# Patient Record
Sex: FEMALE | Race: WHITE
[De-identification: names, ages, dates, MRNs, and addresses within clinical notes are randomized per-mention and may not be internally consistent; named-entity substitution may affect disease eponyms.]

---

## 2018-01-23 ENCOUNTER — HOSPITAL ENCOUNTER (INPATIENT)
Dept: HOSPITAL 14 - H.ER | Age: 23
LOS: 2 days | Discharge: HOME | DRG: 134 | End: 2018-01-25
Attending: INTERNAL MEDICINE | Admitting: INTERNAL MEDICINE
Payer: COMMERCIAL

## 2018-01-23 DIAGNOSIS — G43.909: ICD-10-CM

## 2018-01-23 DIAGNOSIS — J36: Primary | ICD-10-CM

## 2018-01-23 DIAGNOSIS — F32.9: ICD-10-CM

## 2018-01-23 DIAGNOSIS — J45.909: ICD-10-CM

## 2018-01-23 LAB
ALBUMIN SERPL-MCNC: 4.6 G/DL (ref 3.5–5)
ALBUMIN/GLOB SERPL: 1.2 {RATIO} (ref 1–2.1)
ALT SERPL-CCNC: 24 U/L (ref 9–52)
AST SERPL-CCNC: 28 U/L (ref 14–36)
BASOPHILS # BLD AUTO: 0 K/UL (ref 0–0.2)
BASOPHILS NFR BLD: 0.1 % (ref 0–2)
BASOPHILS NFR BLD: 1 % (ref 0–2)
BUN SERPL-MCNC: 11 MG/DL (ref 7–17)
CALCIUM SERPL-MCNC: 9.2 MG/DL (ref 8.4–10.2)
EOSINOPHIL # BLD AUTO: 0 K/UL (ref 0–0.7)
EOSINOPHIL NFR BLD: 0.1 % (ref 0–4)
ERYTHROCYTE [DISTWIDTH] IN BLOOD BY AUTOMATED COUNT: 12.9 % (ref 11.5–14.5)
GFR NON-AFRICAN AMERICAN: > 60
HGB BLD-MCNC: 12.9 G/DL (ref 12–16)
LYMPHOCYTE: 7 % (ref 20–50)
LYMPHOCYTES # BLD AUTO: 0.7 K/UL (ref 1–4.3)
LYMPHOCYTES NFR BLD AUTO: 5.2 % (ref 20–40)
MCH RBC QN AUTO: 29.2 PG (ref 27–31)
MCHC RBC AUTO-ENTMCNC: 32.5 G/DL (ref 33–37)
MCV RBC AUTO: 89.9 FL (ref 81–99)
MONOCYTE: 2 % (ref 0–10)
MONOCYTES # BLD: 0.4 K/UL (ref 0–0.8)
MONOCYTES NFR BLD: 2.8 % (ref 0–10)
NEUTROPHILS # BLD: 11.7 K/UL (ref 1.8–7)
NEUTROPHILS NFR BLD AUTO: 88 % (ref 42–75)
NEUTROPHILS NFR BLD AUTO: 91.8 % (ref 50–75)
NEUTS BAND NFR BLD: 2 % (ref 0–2)
NRBC BLD AUTO-RTO: 0 % (ref 0–0)
PLATELET # BLD EST: NORMAL 10*3/UL
PLATELET # BLD: 331 K/UL (ref 130–400)
PMV BLD AUTO: 6.9 FL (ref 7.2–11.7)
RBC # BLD AUTO: 4.4 MIL/UL (ref 3.8–5.2)
TOTAL CELLS COUNTED BLD: 100
WBC # BLD AUTO: 12.8 K/UL (ref 4.8–10.8)

## 2018-01-23 NOTE — ED PDOC
HPI: CCC, URI, Sore Throat


Time Seen by Provider: 01/23/18 16:22


Chief Complaint (Nursing): ENT Problem


History Per: Patient


Onset/Duration Of Symptoms: Days (6)


Current Symptoms Are (Timing): Still Present


Location Of Pain: Throat


Associated Symptoms: Fever, Sore Throat


Severity: Moderate


Pain Scale Rating Of: 3


Additional Complaint(s): 





Sore throat x 1 week. tx'ed with Augmentin 875 BID x 6 days but has gotten 

worse. Difficult swallowing but denies SOB. Able to swallow saliva.





Past Medical History


Vital Signs: 


 Last Vital Signs











Temp  97.7 F   01/25/18 08:34


 


Pulse  68   01/25/18 08:34


 


Resp  20   01/25/18 08:34


 


BP  94/61 L  01/25/18 08:34


 


Pulse Ox  97   01/25/18 08:34














- Medical History


PMH: No Chronic Diseases





- Family History


Family History: States: Unknown Family Hx





- Home Medications


Home Medications: 


 Ambulatory Orders











 Medication  Instructions  Recorded


 


Sertraline [Zoloft] 100 mg PO DAILY 01/23/18


 


predniSONE [predniSONE Tab] 5 mg PO DAILY 01/23/18














- Allergies


Allergies/Adverse Reactions: 


 Allergies











Allergy/AdvReac Type Severity Reaction Status Date / Time


 


No Known Allergies Allergy   Verified 01/23/18 16:12














Review of Systems


Constitutional: Positive for: Fever


ENT: Positive for: Throat Pain, Throat Swelling


Respiratory: Negative for: Cough


Gastrointestinal: Negative for: Nausea, Vomiting





Physical Exam





- Physical Exam


Appears: Positive for: Non-toxic, No Acute Distress


Skin: Positive for: Normal Color, Warm, DRY


ENT: Positive for: Tonsillar Exudate, Tonsillar Swelling, Other (Tonsils 

touching unable to see uvula.)


Neck: Positive for: Normal


Cardiovascular/Chest: Positive for: Regular Rate, Rhythm


Respiratory: Positive for: Normal Breath Sounds


Extremity: Positive for: Normal ROM


Neurologic/Psych: Positive for: Alert, Oriented





- Laboratory Results


Result Diagrams: 


 01/25/18 06:05





 01/25/18 06:05





- ECG


O2 Sat by Pulse Oximetry: 97





Disposition





- Clinical Impression


Clinical Impression: 


 Tonsillitis, Peritonsillar abscess








- Patient ED Disposition


Is Patient to be Admitted: Transfer of Care





- Disposition


Disposition: Transfer of Care


Disposition Time: 17:00


Condition: FAIR


Patient Signed Over To: Salomón Berman III


Handoff Comments: Pending CT Neck, Labs, reeval

## 2018-01-23 NOTE — ED PDOC
- Laboratory Results


Result Diagrams: 


 01/23/18 17:04





 01/23/18 17:04





- ECG


O2 Sat by Pulse Oximetry: 97 (RA)


Pulse Ox Interpretation: Normal





Medical Decision Making


Medical Decision Making: 


Time: 19:00 


Transfer of care endorsed to me pending CT neck and reevaluation 





Time: 19:14 


CT Neck soft tissue 


FINDINGS:


Nasopharynx: Unremarkable.


Oropharynx: There is tonsillar enlargement bilaterally with enhancement and 

striations, consistent


with pharyngitis. There is a 1.6 x 0.4 cm low-attenuation noted on the right 

which could reflect an


early abscess, (image 30).


Larynx: Unremarkable. Normal epiglottis.


Trachea: Unremarkable.


Retropharyngeal space: Unremarkable.


Submandibular/parotid glands: Unremarkable. Glands are normal in size.


Thyroid: Unremarkable. No enlarged or calcified nodules.


Bones/joints: No acute fracture.


Soft tissues: See above.


Vasculature: No acute findings.


Lymph nodes: There are enlarged lymph nodes identified in both sides of the 

neck which are


presumably related to inflammatory causes although nonspecific.


Lung apices: Unremarkable as visualized.


IMPRESSION:


There is tonsillar enlargement bilaterally with enhancement and striations, 

consistent with


pharyngitis. There is a 1.6 x 0.4 cm low-attenuation noted on the right which 

could reflect an early


abscess.





Reports revealed the possibility of an early abscess. Labs were reviewed and 

are normal. She was given a dose of Zosyn. 





Time: 20:25


--Consulted with Dr. Behin 


--agreed with IV abx and decision to admit





Time: 20:27


--Consulted with Dr. Castillo


--Will admit patient to Dr. Castillo who is covering for Dr. Voss.








--------------------------------------------------------------------------------

-----------------





Scribe Attestation: 


Documented by Kat Solares, acting as a scribe for Shawn Blandon MD 





Provider Scribe Attestation:


All medical record entries made by the Scribe were at my direction and 

personally dictated by me. I have reviewed the chart and agree that the record 

accurately reflects my personal performance of the history, physical exam, 

medical decision making, and the department course for this patient. I have 

also personally directed, reviewed, and agree with the discharge instructions 

and disposition





Disposition


Discussed With Dr.: Farhat Castillo


Counseled Patient/Family Regarding: Studies Performed, Diagnosis





- Clinical Impression


Clinical Impression: 


 Tonsillitis, Peritonsillar abscess








- POA


Present On Arrival: None





- Disposition


Disposition: Hospitalized as Observation Patient


Disposition Time: 20:27


Condition: FAIR


Patient Signed Over To: Farhat Castillo

## 2018-01-24 VITALS — RESPIRATION RATE: 20 BRPM

## 2018-01-24 LAB
ALBUMIN SERPL-MCNC: 4.5 G/DL (ref 3.5–5)
ALBUMIN/GLOB SERPL: 1.2 {RATIO} (ref 1–2.1)
ALT SERPL-CCNC: 17 U/L (ref 9–52)
APTT BLD: 29.4 SECONDS (ref 25.6–37.1)
AST SERPL-CCNC: 21 U/L (ref 14–36)
BUN SERPL-MCNC: 9 MG/DL (ref 7–17)
CALCIUM SERPL-MCNC: 9.5 MG/DL (ref 8.4–10.2)
ERYTHROCYTE [DISTWIDTH] IN BLOOD BY AUTOMATED COUNT: 12.9 % (ref 11.5–14.5)
GFR NON-AFRICAN AMERICAN: > 60
HGB BLD-MCNC: 13.1 G/DL (ref 12–16)
INR PPP: 1.2 (ref 0.9–1.2)
MCH RBC QN AUTO: 29.6 PG (ref 27–31)
MCHC RBC AUTO-ENTMCNC: 32.9 G/DL (ref 33–37)
MCV RBC AUTO: 89.8 FL (ref 81–99)
PLATELET # BLD: 370 K/UL (ref 130–400)
PROTHROMBIN TIME: 13.5 SECONDS (ref 9.8–13.1)
RBC # BLD AUTO: 4.44 MIL/UL (ref 3.8–5.2)
T3: 0.88 NMOL/L (ref 1.49–2.6)
T4 SERPL-MCNC: 8.96 UG/DL (ref 5.5–11)
WBC # BLD AUTO: 8.5 K/UL (ref 4.8–10.8)

## 2018-01-24 PROCEDURE — 0C9P0ZZ DRAINAGE OF TONSILS, OPEN APPROACH: ICD-10-PCS

## 2018-01-24 RX ADMIN — CLINDAMYCIN PHOSPHATE SCH MLS/HR: 150 INJECTION, SOLUTION INTRAMUSCULAR; INTRAVENOUS at 14:02

## 2018-01-24 RX ADMIN — CLINDAMYCIN PHOSPHATE SCH: 150 INJECTION, SOLUTION INTRAMUSCULAR; INTRAVENOUS at 16:22

## 2018-01-24 RX ADMIN — CLINDAMYCIN PHOSPHATE SCH MLS/HR: 150 INJECTION, SOLUTION INTRAMUSCULAR; INTRAVENOUS at 21:47

## 2018-01-24 RX ADMIN — DEXTROSE AND SODIUM CHLORIDE SCH MLS/HR: 5; 450 INJECTION, SOLUTION INTRAVENOUS at 00:18

## 2018-01-24 RX ADMIN — DEXTROSE AND SODIUM CHLORIDE SCH: 5; 450 INJECTION, SOLUTION INTRAVENOUS at 12:04

## 2018-01-24 RX ADMIN — DEXTROSE AND SODIUM CHLORIDE SCH MLS/HR: 5; 450 INJECTION, SOLUTION INTRAVENOUS at 14:05

## 2018-01-24 RX ADMIN — DEXAMETHASONE SODIUM PHOSPHATE SCH MG: 4 INJECTION, SOLUTION INTRAMUSCULAR; INTRAVENOUS at 18:09

## 2018-01-24 RX ADMIN — DEXAMETHASONE SODIUM PHOSPHATE SCH MG: 4 INJECTION, SOLUTION INTRAMUSCULAR; INTRAVENOUS at 12:05

## 2018-01-24 NOTE — CT
PROCEDURE:  CT NECK WITH  CONTRAST



HISTORY:

r/o peritonsilar abscess



COMPARISON:

None



TECHNIQUE:

CT of the neck with intravenous contrast. Coronal and sagittal 

reformats generated.



Intravenous contrast dose: 80 cc Visipaque 300



Radiation dose:  mGy-cm



This CT exam was performed using one or more of the following dose 

reduction techniques: Automated exposure control, adjustment of the 

mA and/or kV according to patient size, and/or use of iterative 

reconstruction technique.



FINDINGS:



NASOPHARYNX:

Unremarkable.



SUPRAHYOID NECK:

There is bilateral enlargement of the tonsils with the right 

tonsillar superficial 10 x 8 mm hypodensity perceived. No definitive 

peripheral rim of intense enhancement disc suggested. - technical FX 

versus very early abscess formation here is a consideration. (Axis 

series 2, image 29 and 30)



INFRAHYOID NECK:

Unremarkable larynx (there is asymmetry of the aeration of the 

vallecular line-only air/gas is seen the right vallecula -none is 

seen in the left vallecula -this can be due to mucous and apposition),

 hypopharynx, and supraglottic space. Vocal cords intact.



MASS:

None.



GLANDS:

Parotid and submandibular glands unremarkable. Normal size thyroid 

gland, without nodule.



LYMPH NODES:

Bilateral nonspecific cervical lymph nodes present likely 

hyperplastic changes relating to patient's inferred tonsillitis 



CERVICAL SPINE:

No fracture or focal lesion. 



VASCULAR STRUCTURES:

Unremarkable.



OTHER FINDINGS:

None.



IMPRESSION:

Bilateral tonsillar enlargement with right superficial possible early 

abscess. The tonsil enlargement results in airway encroachment. The 

cervical lymph nodes present likely hyperplastic changes relating to 

patient's inferred tonsillitis 



Comments: This report is concordant with the preliminary V rad report 



Sign

## 2018-01-24 NOTE — CP.PCM.HP
History of Present Illness





- History of Present Illness


History of Present Illness: 


CC: Throat pain.





23 y/o F, with PMHx. Asthma, Migraine, Depression, came to ED [facility] to be 

evaluated for Sore throat that began a week PTA, Pt was taking Augmentin  875 

bid x 6 days with no relief of pain.





Pt c/o of increased throat discomfort and pain associated to glandular swelling 

b/l, pain was dull, sharp, continue, moderate intensity 6:10.





Worsening symptoms: Difficulty swallowing, low grade fever (tactile while at 

home), headache, ears ache.





Aggravated factor:  Unable to eat.





Pt denied:  SOB, cough, CP, palpitations, abdominal pain, n/v/d, dizziness, 

weakness, sick contact, recent travel outside [place].





Soft Tissue Neck CT shows:  Tonsillar enlargement with R superficial possible 

early abscess.























Present on Admission





- Present on Admission


Any Indicators Present on Admission: No





Review of Systems





- Constitutional


Constitutional: Fever, Headache





- EENT


Eyes: Other (negative)


Ears: Ear Pain


Nose/Mouth/Throat: Sore Throat, Throat Swelling, Neck Pain





- Cardiovascular


Cardiovascular: Other (negative)





- Respiratory


Respiratory: Other (negative)





- Gastrointestinal


Gastrointestinal: Other (negative)





- Genitourinary


Genitourinary: Other (negative)





- Musculoskeletal


Musculoskeletal: Other (negative)





- Integumentary


Integumentary: Other (negative)





- Neurological


Neurological: Headaches





- Psychiatric


Psychiatric: Depression





- Endocrine


Endocrine: Other (negative)





Past Patient History





- Past Medical History & Family History


Past Medical History?: Yes


Pertinent Family History: 





Unknown





- Past Social History


Smoking Status: Never Smoked


Alcohol: None


Drugs: Denies


Home Situation {Lives}: With Family





- CARDIAC


Hx Cardiac Disorders: No





- PULMONARY


Hx Respiratory Disorders: Yes


Hx Asthma: Yes





- NEUROLOGICAL


Hx Neurological Disorder: Yes


Hx Migraine: Yes





- HEENT


Hx HEENT Problems: No





- RENAL


Hx Chronic Kidney Disease: No





- ENDOCRINE/METABOLIC


Hx Endocrine Disorders: No





- HEMATOLOGICAL/ONCOLOGICAL


Hx Blood Disorders: No





- INTEGUMENTARY


Hx Dermatological Problems: No





- MUSCULOSKELETAL/RHEUMATOLOGICAL


Hx Musculoskeletal Disorders: No


Hx Falls: No





- GASTROINTESTINAL


Hx Gastrointestinal Disorders: No





- GENITOURINARY/GYNECOLOGICAL


Hx Genitourinary Disorders: No





- PSYCHIATRIC


Hx Psychophysiologic Disorder: Yes


Hx Depression: Yes


Hx Substance Use: No





- SURGICAL HISTORY


Hx Surgeries: No





- ANESTHESIA


Hx Anesthesia: No


Hx Anesthesia Reactions: No


Hx Malignant Hyperthermia: No


Has any member of the family had a problem w/ anesthesia?: No





Meds


Home Medications: 


 Home Medication List











 Medication  Instructions  Recorded  Confirmed  Type


 


Amoxicillin/Clavulanate [Augmentin 1 tab PO BID #14 tab 01/25/18  Rx





875 MG-125 MG]    


 


Ibuprofen [Motrin] 600 mg PO Q6 PRN #20 tab 01/25/18  Rx


 


Methylprednisolone [Medrol Dose 4 mg PO DAILY #21 mg 01/25/18  Rx





Pack (21 tabs)]    











Allergies/Adverse Reactions: 


 Allergies











Allergy/AdvReac Type Severity Reaction Status Date / Time


 


No Known Allergies Allergy   Verified 01/23/18 16:12














Physical Exam





- Constitutional


Appears: In Acute Distress (mild)





- Head Exam


Head Exam: NORMAL INSPECTION





- Eye Exam


Eye Exam: PERRL





- ENT Exam


Additional comments: 





Throat swelling, redness , enlarged  Tonsils with exudates , L submandibular  

tenderness.





- Respiratory Exam


Respiratory Exam: Clear to Auscultation Bilateral





- Cardiovascular Exam


Cardiovascular Exam: REGULAR RHYTHM





- GI/Abdominal Exam


GI & Abdominal Exam: Normal Bowel Sounds, Soft





- Extremities Exam


Extremities exam: Positive for: normal inspection





- Back Exam


Back exam: NORMAL INSPECTION





- Neurological Exam


Neurological exam: Alert, Oriented x3


Additional comments: 





No motor sensory /deficit.





- Psychiatric Exam


Psychiatric exam: Normal Mood





- Skin


Skin Exam: Warm





Results





- Vital Signs


Recent Vital Signs: 





 Last Vital Signs











Temp  97.9 F   01/24/18 08:26


 


Pulse  78   01/24/18 08:26


 


Resp  20   01/24/18 08:26


 


BP  102/66   01/24/18 08:26


 


Pulse Ox  98   01/24/18 08:26








reviewed [place]





- Labs


Result Diagrams: 


 01/25/18 06:05





 01/25/18 06:05


Labs: 





 Laboratory Results - last 24 hr











  01/23/18 01/23/18 01/23/18





  17:04 17:04 17:04


 


WBC  12.8 H  


 


RBC  4.40  


 


Hgb  12.9  


 


Hct  39.5  


 


MCV  89.9  


 


MCH  29.2  


 


MCHC  32.5 L  


 


RDW  12.9  


 


Plt Count  331  


 


MPV  6.9 L  


 


Neut % (Auto)  91.8 H  


 


Lymph % (Auto)  5.2 L  


 


Mono % (Auto)  2.8  


 


Eos % (Auto)  0.1  


 


Baso % (Auto)  0.1  


 


Neut #  11.7 H  


 


Lymph #  0.7 L  


 


Mono #  0.4  


 


Eos #  0.0  


 


Baso #  0.0  


 


Neutrophils % (Manual)  88 H  


 


Band Neutrophils %  2  


 


Lymphocytes % (Manual)  7 L  


 


Monocytes % (Manual)  2  


 


Basophils % (Manual)  1  


 


Platelet Estimate  Normal  


 


PT   


 


INR   


 


APTT   


 


Sodium   139 


 


Potassium   4.2 


 


Chloride   100 


 


Carbon Dioxide   25 


 


Anion Gap   18 


 


BUN   11 


 


Creatinine   0.6 L 


 


Est GFR ( Amer)   > 60 


 


Est GFR (Non-Af Amer)   > 60 


 


Random Glucose   95 


 


Calcium   9.2 


 


Total Bilirubin   0.5 


 


AST   28 


 


ALT   24 


 


Alkaline Phosphatase   75 


 


Total Protein   8.3 H 


 


Albumin   4.6 


 


Globulin   3.8 


 


Albumin/Globulin Ratio   1.2 


 


Thyroxine (T4)   


 


Total T3   


 


Infectious Mono Assay    Negative


 


Influenza Typ A,B (EIA)   


 


Grp A Beta Strep Ag   














  01/23/18 01/23/18 01/24/18





  17:04 17:04 06:05


 


WBC    8.5


 


RBC    4.44


 


Hgb    13.1


 


Hct    39.9


 


MCV    89.8


 


MCH    29.6


 


MCHC    32.9 L


 


RDW    12.9


 


Plt Count    370


 


MPV   


 


Neut % (Auto)   


 


Lymph % (Auto)   


 


Mono % (Auto)   


 


Eos % (Auto)   


 


Baso % (Auto)   


 


Neut #   


 


Lymph #   


 


Mono #   


 


Eos #   


 


Baso #   


 


Neutrophils % (Manual)   


 


Band Neutrophils %   


 


Lymphocytes % (Manual)   


 


Monocytes % (Manual)   


 


Basophils % (Manual)   


 


Platelet Estimate   


 


PT   


 


INR   


 


APTT   


 


Sodium   


 


Potassium   


 


Chloride   


 


Carbon Dioxide   


 


Anion Gap   


 


BUN   


 


Creatinine   


 


Est GFR ( Amer)   


 


Est GFR (Non-Af Amer)   


 


Random Glucose   


 


Calcium   


 


Total Bilirubin   


 


AST   


 


ALT   


 


Alkaline Phosphatase   


 


Total Protein   


 


Albumin   


 


Globulin   


 


Albumin/Globulin Ratio   


 


Thyroxine (T4)   


 


Total T3   


 


Infectious Mono Assay   


 


Influenza Typ A,B (EIA)  Negative for flu a/b  


 


Grp A Beta Strep Ag   Negative 














  01/24/18 01/24/18





  06:05 06:05


 


WBC  


 


RBC  


 


Hgb  


 


Hct  


 


MCV  


 


MCH  


 


MCHC  


 


RDW  


 


Plt Count  


 


MPV  


 


Neut % (Auto)  


 


Lymph % (Auto)  


 


Mono % (Auto)  


 


Eos % (Auto)  


 


Baso % (Auto)  


 


Neut #  


 


Lymph #  


 


Mono #  


 


Eos #  


 


Baso #  


 


Neutrophils % (Manual)  


 


Band Neutrophils %  


 


Lymphocytes % (Manual)  


 


Monocytes % (Manual)  


 


Basophils % (Manual)  


 


Platelet Estimate  


 


PT  13.5 H 


 


INR  1.2 


 


APTT  29.4 


 


Sodium   141


 


Potassium   4.3


 


Chloride   103


 


Carbon Dioxide   24


 


Anion Gap   18


 


BUN   9


 


Creatinine   0.5 L


 


Est GFR ( Amer)   > 60


 


Est GFR (Non-Af Amer)   > 60


 


Random Glucose   148 H


 


Calcium   9.5


 


Total Bilirubin   0.4


 


AST   21


 


ALT   17


 


Alkaline Phosphatase   72


 


Total Protein   8.3 H


 


Albumin   4.5


 


Globulin   3.8


 


Albumin/Globulin Ratio   1.2


 


Thyroxine (T4)   8.96


 


Total T3   0.882 L


 


Infectious Mono Assay  


 


Influenza Typ A,B (EIA)  


 


Grp A Beta Strep Ag  








reviewed J.P.





- Impressions


Impression: 





Soft Tissue Nck CT:  Reviewed J.P.





Assessment & Plan


(1) Peritonsillar abscess


Status: Acute   Priority: High   





(2) Tonsillitis


Status: Acute   Priority: High   





(3) Bronchial asthma


Status: Chronic   





(4) Migraine


Status: Acute   





(5) Migraine


Status: Chronic   





- Assessment and Plan (Free Text)


Plan: 


Continue Clinda, Decadron, Toradol, Ultram and rest of Tx. For Abscess draining 

by ENT consultant








- Date & Time


Date: 01/24/18


Time: 10:00

## 2018-01-24 NOTE — OP
PROCEDURE DATE:  01/24/2018



PREOPERATIVE DIAGNOSIS:  Possible left peritonsillar abscess.



POSTOPERATIVE DIAGNOSIS:  Possible left peritonsillar abscess.



PROCEDURE:  Incision and drainage of peritonsillar abscess.



SIGNIFICANT FINDINGS:  Small amount of pus noted in the left peritonsillar

area.



DESCRIPTION OF PROCEDURE:  The patient was placed in a seated position. 

The left peritonsillar area was injected with lidocaine with epinephrine. 

An incision was made in the left peritonsillar area using a #11 blade,

clamp dissection was done.  A small amount of pus was noted coming

out and it was opened using clamp dissection.  Bleeding was controlled with

time.  The patient tolerated the procedure well.  It should be noted that

on the CAT scan what they read as an abscess was actually some collection

inside the tonsil itself.  However, the patient's pain was on the left and

symptomatically she had on the left, therefore the left side was explored.





__________________________________________

Babak Behin, MD



DD:  01/24/2018 12:27:49

DT:  01/24/2018 14:16:51

Job # [id number]



[email or web address]

## 2018-01-25 VITALS
SYSTOLIC BLOOD PRESSURE: 94 MMHG | HEART RATE: 68 BPM | TEMPERATURE: 97.7 F | DIASTOLIC BLOOD PRESSURE: 61 MMHG | OXYGEN SATURATION: 97 %

## 2018-01-25 LAB
BUN SERPL-MCNC: 12 MG/DL (ref 7–17)
CALCIUM SERPL-MCNC: 9 MG/DL (ref 8.4–10.2)
ERYTHROCYTE [DISTWIDTH] IN BLOOD BY AUTOMATED COUNT: 12.7 % (ref 11.5–14.5)
GFR NON-AFRICAN AMERICAN: > 60
HGB BLD-MCNC: 12.2 G/DL (ref 12–16)
MCH RBC QN AUTO: 29.9 PG (ref 27–31)
MCHC RBC AUTO-ENTMCNC: 33.2 G/DL (ref 33–37)
MCV RBC AUTO: 90.1 FL (ref 81–99)
PLATELET # BLD: 348 K/UL (ref 130–400)
RBC # BLD AUTO: 4.07 MIL/UL (ref 3.8–5.2)
WBC # BLD AUTO: 13.7 K/UL (ref 4.8–10.8)

## 2018-01-25 RX ADMIN — CLINDAMYCIN PHOSPHATE SCH MLS/HR: 150 INJECTION, SOLUTION INTRAMUSCULAR; INTRAVENOUS at 05:54

## 2018-01-25 RX ADMIN — DEXAMETHASONE SODIUM PHOSPHATE SCH MG: 4 INJECTION, SOLUTION INTRAMUSCULAR; INTRAVENOUS at 00:39

## 2018-01-25 RX ADMIN — DEXAMETHASONE SODIUM PHOSPHATE SCH MG: 4 INJECTION, SOLUTION INTRAMUSCULAR; INTRAVENOUS at 12:24

## 2018-01-25 RX ADMIN — DEXAMETHASONE SODIUM PHOSPHATE SCH MG: 4 INJECTION, SOLUTION INTRAMUSCULAR; INTRAVENOUS at 06:46

## 2018-01-25 NOTE — CP.PCM.PN
Subjective





- Date & Time of Evaluation


Date of Evaluation: 01/25/18





- Subjective


Subjective: 





F/U  Peritonsillar abscess


Left  tonsillar pain decreased





Objective





- Vital Signs/Intake and Output


Vital Signs (last 24 hours): 


 











Temp Pulse Resp BP Pulse Ox


 


 97.7 F   68   20   94/61 L  97 


 


 01/25/18 08:34  01/25/18 08:34  01/25/18 08:34  01/25/18 08:34  01/25/18 10:51











- Medications


Medications: 


 Current Medications





Dexamethasone (Decadron Inj)  4 mg IVP Q6H [facility]


   Last Admin: 01/25/18 12:24 Dose:  4 mg


Clindamycin Phosphate (Cleocin In Normal Saline)  600 mg in 50 mls @ 50 mls/hr 

IVPB Q8@0600,1400,2200 [facility]


   PRN Reason: Protocol


   Last Admin: 01/25/18 05:54 Dose:  50 mls/hr


Ketorolac Tromethamine (Toradol)  15 mg IVP Q6 PRN


   PRN Reason: Pain, severe (8-10)


   Last Admin: 01/25/18 06:20 Dose:  15 mg


Sertraline HCl (Zoloft)  100 mg PO DAILY [facility]


   Last Admin: 01/25/18 08:31 Dose:  100 mg


Tramadol HCl (Ultram)  50 mg PO Q6 PRN


   PRN Reason: Pain, moderate (4-7)


   Last Admin: 01/25/18 11:30 Dose:  50 mg











- Labs


Labs: 


 





 01/25/18 06:05 





 01/25/18 06:05 





 











PT  13.5 Seconds (9.8-13.1)  H  01/24/18  06:05    


 


INR  1.2  (0.9-1.2)   01/24/18  06:05    


 


APTT  29.4 Seconds (25.6-37.1)   01/24/18  06:05    














- Constitutional


Appears: No Acute Distress





- Head Exam


Head Exam: NORMAL INSPECTION





- Eye Exam


Eye Exam: PERRL





- ENT Exam


Additional comments: 





Throat less swelling, redness, enlarged tonsils with exudates, L mandibular 

tenderness.





- Neck Exam


Additional comments: 





Left  submandibular tenderness





- Respiratory Exam


Respiratory Exam: Clear to Ausculation Bilateral





- Cardiovascular Exam


Cardiovascular Exam: REGULAR RHYTHM





- GI/Abdominal Exam


GI & Abdominal Exam: Soft, Normal Bowel Sounds





- Extremities Exam


Extremities Exam: Normal Inspection





- Back Exam


Back Exam: NORMAL INSPECTION





- Neurological Exam


Neurological Exam: Alert, Oriented x3.  absent: Motor Sensory Deficit





- Psychiatric Exam


Psychiatric exam: Normal Mood





- Skin


Skin Exam: Warm





Assessment and Plan


(1) Peritonsillar abscess


Assessment & Plan: 





Left


Status: Acute   





(2) Tonsillitis


Status: Acute   





(3) Bronchial asthma


Status: Chronic   





(4) Migraine


Status: Chronic   





- Assessment and Plan (Free Text)


Plan: 





s/p drainage  L Tonsillar  abcess yesterday 1-24-25  by Dr Behin, Patient  is  

improved  and  stable  to  be  discharged , see  MAR , Patient  on Clinda , 

Decadron , Toradol , f/u PMD  Dr Voss and  Dr Behin next  week.

## 2018-01-26 NOTE — CP.PCM.DIS
Provider





- Provider


Date of Admission: 


01/24/18 14:26





Attending physician: 


Farhat Castillo MD





Consults: 





ENT


Time Spent in preparation of Discharge (in minutes): 35





Diagnosis





- Discharge Diagnosis


(1) Peritonsillar abscess


Status: Acute   Priority: High   





(2) Tonsillitis


Status: Acute   Priority: High   





(3) Bronchial asthma


Status: Chronic   





(4) Migraine


Status: Chronic   





(5) Migraine


Status: Deleted   





Hospital Course





- Lab Results


Lab Results: 


 Micro Results





01/23/18 17:04   Throat   Group A Strep Throat Culture - Final


                            NO BETA STREP GROUP A ISOLATED.





 Most Recent Lab Values











WBC  13.7 K/uL (4.8-10.8)  H D 01/25/18  06:05    


 


RBC  4.07 Mil/uL (3.80-5.20)   01/25/18  06:05    


 


Hgb  12.2 g/dL (12.0-16.0)   01/25/18  06:05    


 


Hct  36.6 % (34.0-47.0)   01/25/18  06:05    


 


MCV  90.1 fl (81.0-99.0)   01/25/18  06:05    


 


MCH  29.9 pg (27.0-31.0)   01/25/18  06:05    


 


MCHC  33.2 g/dL (33.0-37.0)   01/25/18  06:05    


 


RDW  12.7 % (11.5-14.5)   01/25/18  06:05    


 


Plt Count  348 K/uL (130-400)   01/25/18  06:05    


 


MPV  6.9 fl (7.2-11.7)  L  01/23/18  17:04    


 


Neut % (Auto)  91.8 % (50.0-75.0)  H  01/23/18  17:04    


 


Lymph % (Auto)  5.2 % (20.0-40.0)  L  01/23/18  17:04    


 


Mono % (Auto)  2.8 % (0.0-10.0)   01/23/18  17:04    


 


Eos % (Auto)  0.1 % (0.0-4.0)   01/23/18  17:04    


 


Baso % (Auto)  0.1 % (0.0-2.0)   01/23/18  17:04    


 


Neut #  11.7 K/uL (1.8-7.0)  H  01/23/18  17:04    


 


Lymph #  0.7 K/uL (1.0-4.3)  L  01/23/18  17:04    


 


Mono #  0.4 K/uL (0.0-0.8)   01/23/18  17:04    


 


Eos #  0.0 K/uL (0.0-0.7)   01/23/18  17:04    


 


Baso #  0.0 K/uL (0.0-0.2)   01/23/18  17:04    


 


Neutrophils % (Manual)  88 % (42-75)  H  01/23/18  17:04    


 


Band Neutrophils %  2 % (0-2)   01/23/18  17:04    


 


Lymphocytes % (Manual)  7 % (20-50)  L  01/23/18  17:04    


 


Monocytes % (Manual)  2 % (0-10)   01/23/18  17:04    


 


Basophils % (Manual)  1 % (0-2)   01/23/18  17:04    


 


Platelet Estimate  Normal  (NORMAL)   01/23/18  17:04    


 


PT  13.5 Seconds (9.8-13.1)  H  01/24/18  06:05    


 


INR  1.2  (0.9-1.2)   01/24/18  06:05    


 


APTT  29.4 Seconds (25.6-37.1)   01/24/18  06:05    


 


Sodium  143 mmol/l (132-148)   01/25/18  06:05    


 


Potassium  4.4 MMOL/L (3.6-5.0)   01/25/18  06:05    


 


Chloride  104 mmol/L ()   01/25/18  06:05    


 


Carbon Dioxide  27 mmol/L (22-30)   01/25/18  06:05    


 


Anion Gap  16  (10-20)   01/25/18  06:05    


 


BUN  12 mg/dl (7-17)   01/25/18  06:05    


 


Creatinine  0.6 mg/dl (0.7-1.2)  L  01/25/18  06:05    


 


Est GFR ( Amer)  > 60   01/25/18  06:05    


 


Est GFR (Non-Af Amer)  > 60   01/25/18  06:05    


 


Random Glucose  141 mg/dL ()  H  01/25/18  06:05    


 


Calcium  9.0 mg/dL (8.4-10.2)   01/25/18  06:05    


 


Total Bilirubin  0.4 mg/dl (0.2-1.3)   01/24/18  06:05    


 


AST  21 U/L (14-36)   01/24/18  06:05    


 


ALT  17 U/L (9-52)   01/24/18  06:05    


 


Alkaline Phosphatase  72 U/L ()   01/24/18  06:05    


 


Total Protein  8.3 G/DL (6.3-8.2)  H  01/24/18  06:05    


 


Albumin  4.5 g/dL (3.5-5.0)   01/24/18  06:05    


 


Globulin  3.8 gm/dL (2.2-3.9)   01/24/18  06:05    


 


Albumin/Globulin Ratio  1.2  (1.0-2.1)   01/24/18  06:05    


 


Thyroxine (T4)  8.96 ug/dl (5.5-11.0)   01/24/18  06:05    


 


Total T3  0.882 nmol/L (1.49-2.60)  L  01/24/18  06:05    


 


Infectious Mono Assay  Negative  (NEGATIVE)   01/23/18  17:04    


 


Influenza Typ A,B (EIA)  Negative for flu a/b  (NEGATIVE)   01/23/18  17:04    


 


Grp A Beta Strep Ag  Negative  (NEGATIVE)   01/23/18  17:04    








yrs





- Hospital Course


Hospital Course: 





22 ys old female AD  for  Sore Throat for  one  week PTA, patient   was taking  

Augmentin and  Prednisone with no improvement with increased  throat  

discomfort , difficulty swall.owing headache patient  was  seen ER [facility] and  AD.





- Date & Time of H&P


Date of H&P: 01/24/18


Time of H&P: 10:00





Discharge Exam





- Head Exam


Head Exam: NORMAL INSPECTION





- Eye Exam


Eye Exam: PERRL





- ENT Exam


Additional comments: 





Throat less swelling, redness, enlarged tonsils with exudates, L mandibular 

tenderness.





- Neck Exam


Neck exam: Tenderness (L submandibular)





- Respiratory Exam


Additional comments: 





Clear to Ausculation bilateral





- Cardiovascular Exam


Cardiovascular Exam: REGULAR RHYTHM





- GI/Abdominal Exam


GI & Abdominal Exam: Normal Bowel Sounds, Soft





- Extremities Exam


Extremities exam: normal inspection





- Back Exam


Back exam: NORMAL INSPECTION





- Neurological Exam


Neurological exam: Alert, Oriented x3


Additional comments: 





No motor sensory deficit.





- Psychiatric Exam


Psychiatric exam: Normal Mood





- Skin


Skin Exam: Warm





Discharge Plan





- Discharge Medications


Prescriptions: 


Amoxicillin/Clavulanate [Augmentin 875 MG-125 MG] 1 tab PO BID #14 tab


Methylprednisolone [Medrol Dose Pack (21 tabs)] 4 mg PO DAILY #21 mg


Ibuprofen [Motrin] 600 mg PO Q6 PRN #20 tab


 PRN Reason: Pain, Severe (8-10)





- Follow Up Plan


Condition: FAIR


Disposition: HOME/ ROUTINE


Patient education suggested?: Yes


Instructions:  Peritonsillar Abscess (DC)


Additional Instructions: 


Complete all antibiotics. F/u with ENT, Dr. Behin in 1 week and with PCp in 1-2 

weeks.


Referrals: 


Behin,Babak, MD [Staff Provider] - 


Pk Voss MD [Family Provider] -

## 2018-01-26 NOTE — PQF GENQUE
Dr. Castillo,



Please clarify type of asthma: if known: i.e.

     Mild intermittent 

     Mild persistent 

     Moderate persistent 

     Severe persistent 

     With bronchitis(please clarify acuity of bronchitis) 

     With chronic lung disease (please document specific chronic lung disease) 

     Other (please specify)_____________________ 

     Clinically unable to determine 

     Unknown 

 2. Please clarify acuity of asthma: 

     Uncomplicated 

     With exacerbation(acute) 

     With status asthmaticus 

     Other (please specify)____________________ 

     Clinically unable to determine 

     Unknown



H and P: PE:Respiratory Exam: Clear to Auscultation Bilateral ; diagnoses 
include: Peritonsillar abscess: Acute ; Tonsillitis:Acute  Bronchial asthma  
Status: Chronic 



Dexamethasone IV













***** This form is a permanent part of the medical record*****





          

Clarification of your documentation is requested to better reflect the severity 
of illness and intensity of treatment of your patient.  



Indicators present      



[]  Specify: []

         



[]  Specify: []         

 



[]  Specify: []         





[]  Specify: []         





Location in the medical record that reflects the above clinical findings:  []

 



Treatment Provided:  []

  

PHYSICIAN'S RESPONSE





Based on your medical judgment of the clinical indicators outlined above please 
clarify the following:



[]  Practitioner response 



[]  If unable to determine, please check the box, sign and date.  





Present On Admission (POA) Indicator:





[] Present at the time of admission 



[] Not present at the time of admission



[] Clinically Undetermined









In responding to this query, please exercise your independent professional 
judgment.  The fact that a question is asked does not imply that any particular 
answer is desired or expected.  Thank you for your clarification on this 
documentation.



If you have any questions please call.

* Thank you,

   Izabela Gonzalez RN

   ext. #[phone]
[facility]

## 2023-08-02 NOTE — ED PDOC
- Laboratory Results


Result Diagrams: 


 01/23/18 17:04





 01/23/18 17:04





- ECG


O2 Sat by Pulse Oximetry: 97 (RA)


Pulse Ox Interpretation: Normal





Medical Decision Making


Medical Decision Making: 


Time: 17:00 


--Patient signed over to me pending CT Head results, labs and reevaluation. 





Labs are negative for strep and influenza. 





pt tolerating secretions without drooling

















Disposition





- Clinical Impression


Clinical Impression: 


 Tonsillitis








- POA


Present On Arrival: None





- Disposition


Disposition: Transfer of Care


Disposition Time: 19:16


Condition: STABLE


Forms:  [facility] (English)


Patient Signed Over To: Shawn Blandon


Handoff Comments: pending CT result and dispo/ENT show